# Patient Record
Sex: FEMALE | Race: WHITE | Employment: FULL TIME | URBAN - METROPOLITAN AREA
[De-identification: names, ages, dates, MRNs, and addresses within clinical notes are randomized per-mention and may not be internally consistent; named-entity substitution may affect disease eponyms.]

---

## 2017-04-06 ENCOUNTER — TRANSFERRED RECORDS (OUTPATIENT)
Dept: HEALTH INFORMATION MANAGEMENT | Facility: CLINIC | Age: 67
End: 2017-04-06

## 2017-04-11 ENCOUNTER — TRANSFERRED RECORDS (OUTPATIENT)
Dept: HEALTH INFORMATION MANAGEMENT | Facility: CLINIC | Age: 67
End: 2017-04-11

## 2017-04-20 ENCOUNTER — TRANSFERRED RECORDS (OUTPATIENT)
Dept: HEALTH INFORMATION MANAGEMENT | Facility: CLINIC | Age: 67
End: 2017-04-20

## 2017-04-21 ENCOUNTER — TRANSFERRED RECORDS (OUTPATIENT)
Dept: HEALTH INFORMATION MANAGEMENT | Facility: CLINIC | Age: 67
End: 2017-04-21

## 2017-05-12 ENCOUNTER — HISTORIC RESULTS (OUTPATIENT)
Dept: ADMINISTRATIVE | Age: 67
End: 2017-05-12

## 2017-05-16 ENCOUNTER — TRANSFERRED RECORDS (OUTPATIENT)
Dept: HEALTH INFORMATION MANAGEMENT | Facility: CLINIC | Age: 67
End: 2017-05-16

## 2017-05-23 ENCOUNTER — TRANSFERRED RECORDS (OUTPATIENT)
Dept: HEALTH INFORMATION MANAGEMENT | Facility: CLINIC | Age: 67
End: 2017-05-23

## 2017-06-02 ENCOUNTER — TRANSFERRED RECORDS (OUTPATIENT)
Dept: HEALTH INFORMATION MANAGEMENT | Facility: CLINIC | Age: 67
End: 2017-06-02

## 2017-06-05 ENCOUNTER — OFFICE VISIT (OUTPATIENT)
Dept: RADIATION ONCOLOGY | Facility: CLINIC | Age: 67
End: 2017-06-05
Payer: COMMERCIAL

## 2017-06-05 VITALS
BODY MASS INDEX: 25.47 KG/M2 | TEMPERATURE: 98 F | SYSTOLIC BLOOD PRESSURE: 134 MMHG | OXYGEN SATURATION: 98 % | HEART RATE: 59 BPM | HEIGHT: 67 IN | RESPIRATION RATE: 18 BRPM | WEIGHT: 162.3 LBS | DIASTOLIC BLOOD PRESSURE: 61 MMHG

## 2017-06-05 DIAGNOSIS — C50.411 MALIGNANT NEOPLASM OF UPPER-OUTER QUADRANT OF RIGHT FEMALE BREAST (H): Primary | ICD-10-CM

## 2017-06-05 PROCEDURE — 99205 OFFICE O/P NEW HI 60 MIN: CPT | Performed by: RADIOLOGY

## 2017-06-05 RX ORDER — ASPIRIN 81 MG/1
81 TABLET, CHEWABLE ORAL
COMMUNITY
Start: 2017-04-21

## 2017-06-05 RX ORDER — UBIDECARENONE 100 MG
100 CAPSULE ORAL
COMMUNITY
Start: 2017-04-21

## 2017-06-05 RX ORDER — ZOLPIDEM TARTRATE 5 MG/1
5 TABLET ORAL
COMMUNITY
Start: 2016-09-27

## 2017-06-05 RX ORDER — FEXOFENADINE HCL 60 MG/1
60 TABLET, FILM COATED ORAL
COMMUNITY
Start: 2017-04-21

## 2017-06-05 RX ORDER — LEVOTHYROXINE SODIUM 200 UG/1
200 TABLET ORAL
COMMUNITY
Start: 2016-09-27

## 2017-06-05 ASSESSMENT — LIFESTYLE VARIABLES: SUBSTANCE_ABUSE: 0

## 2017-06-05 ASSESSMENT — ENCOUNTER SYMPTOMS
CARDIOVASCULAR NEGATIVE: 1
MUSCULOSKELETAL NEGATIVE: 1
RESPIRATORY NEGATIVE: 1
HALLUCINATIONS: 0
DEPRESSION: 0
NERVOUS/ANXIOUS: 1
INSOMNIA: 0
EYES NEGATIVE: 1
CONSTITUTIONAL NEGATIVE: 1
MEMORY LOSS: 0
GASTROINTESTINAL NEGATIVE: 1

## 2017-06-05 ASSESSMENT — PAIN SCALES - GENERAL: PAINLEVEL: NO PAIN (0)

## 2017-06-05 NOTE — PROGRESS NOTES
HPI      Review of Systems   Constitutional: Negative.    HENT: Negative.    Eyes: Negative.    Respiratory: Negative.    Cardiovascular: Negative.    Gastrointestinal: Negative.    Genitourinary: Negative.    Musculoskeletal: Negative.    Skin: Negative.    Endo/Heme/Allergies: Negative.    Psychiatric/Behavioral: Negative for depression, hallucinations, memory loss, substance abuse and suicidal ideas. The patient is nervous/anxious. The patient does not have insomnia.         Patient reports nervousness/anxiety with recent diagnosis and treatment options, reports strong support system.             INITIAL PATIENT ASSESSMENT    Diagnosis: DCIS (right breast)    Prior radiation therapy: None    Prior chemotherapy: None    Prior hormonal therapy: Patient reports history of oral birth control use for approximately six months, also reports use IUD and Estradiol.      Pain Eval:  Denies    Psychosocial  Living arrangements: Lives at home in [place] with [place]  Fall Risk: independent   referral needs: Not needed    Advanced Directive: Yes, on file with [facility]  Implantable Cardiac Device? No    Onset of menarche: age 10  LMP: No LMP recorded. Patient is postmenopausal.  Onset of menopause: age 45  Abnormal vaginal bleeding/discharge: No  Are you pregnant? No  Reproductive note: Patient reports history of two pregnancies, two living children, first pregnancy at age 22.  History of tubal ligation in 1980.    PCP: Dr. Sonia Bob  Surgeon: Dr. Steffanie Bee

## 2017-06-05 NOTE — MR AVS SNAPSHOT
After Visit Summary   6/5/2017    Debbie Acuna    MRN: [id number]           Patient Information     Date Of Birth          1950        Visit Information        Provider Department      6/5/2017 1:00 PM Jennifer Haley MD [facility]        Today's Diagnoses     Malignant neoplasm of upper-outer quadrant of right female breast (H)    -  1      Care Instructions    Please contact [facility] RN with questions or concerns following today's appointment: [phone].    Thank you!            Follow-ups after your visit        Who to contact     If you have questions or need follow up information about today's clinic visit or your schedule please contact [facility] directly at [phone].  Normal or non-critical lab and imaging results will be communicated to you by agÃƒÂ¡mi Systemshart, letter or phone within 4 business days after the clinic has received the results. If you do not hear from us within 7 days, please contact the clinic through agÃƒÂ¡mi Systemshart or phone. If you have a critical or abnormal lab result, we will notify you by phone as soon as possible.  Submit refill requests through ZoomForth or call your pharmacy and they will forward the refill request to us. Please allow 3 business days for your refill to be completed.          Additional Information About Your Visit        agÃƒÂ¡mi SystemsharCarbay Information     ZoomForth gives you secure access to your electronic health record. If you see a primary care provider, you can also send messages to your care team and make appointments. If you have questions, please call your primary care clinic.  If you do not have a primary care provider, please call [phone] and they will assist you.      ZoomForth is an electronic gateway that provides easy, online access to your medical records. With ZoomForth, you can request a clinic appointment, read your test results, renew a prescription or communicate with your care  "team.     To access your existing account, please contact your [facility] or call [phone] for assistance.        Care EveryWhere ID     This is your Care EveryWhere ID. This could be used by other organizations to access your [place] medical records  [id number]        Your Vitals Were     Pulse Temperature Respirations Height Pulse Oximetry BMI (Body Mass Index)    59 98  F (36.7  C) (Oral) 18 1.689 m (5' 6.5\") 98% 25.8 kg/m2       Blood Pressure from Last 3 Encounters:   06/05/17 134/61   09/23/15 118/72   08/30/06 112/64    Weight from Last 3 Encounters:   06/05/17 73.6 kg (162 lb 4.8 oz)   09/23/15 83 kg (183 lb)   08/30/06 77.1 kg (170 lb)              Today, you had the following     No orders found for display         Today's Medication Changes          These changes are accurate as of: 6/5/17  4:15 PM.  If you have any questions, ask your nurse or doctor.               These medicines have changed or have updated prescriptions.        Dose/Directions    fexofenadine 60 MG tablet   Commonly known as:  ALLEGRA   This may have changed:  Another medication with the same name was removed. Continue taking this medication, and follow the directions you see here.   Changed by:  Jennifer Haley MD        Dose:  60 mg   Take 60 mg by mouth   Refills:  0       levothyroxine 200 MCG tablet   Commonly known as:  SYNTHROID/LEVOTHROID   This may have changed:  Another medication with the same name was removed. Continue taking this medication, and follow the directions you see here.   Changed by:  Jennifer Haley MD        Dose:  200 mcg   Take 200 mcg by mouth   Refills:  0         Stop taking these medicines if you haven't already. Please contact your care team if you have questions.     diclofenac 1 % Gel topical gel   Commonly known as:  VOLTAREN   Stopped by:  Jennifer Haley MD           methocarbamol 750 MG tablet   Generic drug:  methocarbamol "   Stopped by:  Jennifer Haley MD           scopolamine 72 hr patch   Commonly known as:  TRANSDERM   Stopped by:  Jennifer Haley MD                    Primary Care Provider Office Phone # Fax #    Sonia Florecita Bob [phone]       [address] [address]        Thank you!     Thank you for choosing [facility]  for your care. Our goal is always to provide you with excellent care. Hearing back from our patients is one way we can continue to improve our services. Please take a few minutes to complete the written survey that you may receive in the mail after your visit with us. Thank you!             Your Updated Medication List - Protect others around you: Learn how to safely use, store and throw away your medicines at www.disposemymeds.org.          This list is accurate as of: 6/5/17  4:15 PM.  Always use your most recent med list.                   Brand Name Dispense Instructions for use    aspirin 81 MG chewable tablet      Take 81 mg by mouth       calcium carb 1250 mg (500 mg [place])/vitamin D 200 units 500-200 MG-UNIT per tablet    OSCAL with D     Take 1 tablet by mouth       fexofenadine 60 MG tablet    ALLEGRA     Take 60 mg by mouth       levothyroxine 200 MCG tablet    SYNTHROID/LEVOTHROID     Take 200 mcg by mouth       NAPROSYN 500 MG tablet   Generic drug:  naproxen     60    1 TABLET TWICE DAILY as needed for back pain       SM COENZYME Q-10 100 MG Caps capsule   Generic drug:  co-enzyme Q-10      Take 100 mg by mouth       TYLENOL PO      Take 500 mg by mouth       zolpidem 5 MG tablet    AMBIEN     Take 5 mg by mouth

## 2017-06-05 NOTE — PROGRESS NOTES
RADIATION ONCOLOGY CONSULT NOTE  Date of Visit: 2017    Debbie Acuna  MRN: [id number]  : 1950    Debbie Acuna is being seen today for initial consultation at the request of Dr. Steffanie Bee for consideration of radiation therapy for  DCIS of the R breast          HISTORY OF PRESENT ILLNESS:  Ms. Acuna is a 66 year old female who underwent routine grady mammography 17.  An abnormal area was noted on the R and she underwent add'l views on 17.  At that time she had a 1.4 cm area of grouped calcifications in the R breast at 10:00 5.5cm from the nipple. She underwent core bx which confirmed grade 3 DCIS w/ necrosis and was ER neg.  MRI of the breasts revealed no other sites of concern.  She underwent R lumpectomy by Dr. Bee on 17.  Final path confirmed a 4.1 cm grade 3 DCIS w/ necrosis and positive margins.  Re-excision on 17 was performed with very close (less than 1mm) margins laterally, superiorly and inferiorly. Dr. Bee discussed options w/ Ms. Acuna and asked for a discussion w/ radiation oncology as well.      Ms. Acuna has a family history of breast cancer as her sister was treated at age 52.    , age 24 at time of first child's birth, menarche age 10, LMP age 47        All pertinent labs, imaging, and pathology findings have been reviewed.       CHEMOTHERAPY HISTORY: none    PAST RADIATION THERAPY HISTORY:  none      PAST MEDICAL/SURGICAL HISTORY:  Past Medical History:   Diagnosis Date     Allergic rhinitis, cause unspecified      Backache, unspecified      DCIS (ductal carcinoma in situ) 2017    Right breast     Displacement of lumbar intervertebral disc without myelopathy     herniated disc by MRI     Other extrapyramidal disease and abnormal movement disorder      Pain in limb     Leg pain - Left     Unspecified hypothyroidism      Vaginal dryness, menopausal      Past Surgical History:   Procedure Laterality Date     BREAST BIOPSY, CORE RT/LT  Right 2017     C LIGATE FALLOPIAN TUBE       HC COLONOSCOPY W/WO BRUSH/WASH  05     LUMPECTOMY BREAST Right 2017    Dr. Steffanie Bee     RIGHT BREAST RE-EXCISION LUMPECTOMY Right 2017    Dr. Steffanie Bee     TONSILLECTOMY      age 4       ALLERGIES:  Allergies as of 2017 - [place] as Reviewed 2017   Allergen Reaction Noted     No known drug allergies  2005       MEDICATIONS:  Current Outpatient Prescriptions   Medication Sig Dispense Refill     aspirin 81 MG chewable tablet Take 81 mg by mouth       calcium carb 1250 mg, 500 mg [place],/vitamin D 200 units (OSCAL WITH D) 500-200 MG-UNIT per tablet Take 1 tablet by mouth       co-enzyme Q-10 (SM COENZYME Q-10) 100 MG CAPS capsule Take 100 mg by mouth       zolpidem (AMBIEN) 5 MG tablet Take 5 mg by mouth       levothyroxine (SYNTHROID/LEVOTHROID) 200 MCG tablet Take 200 mcg by mouth       Acetaminophen (TYLENOL PO) Take 500 mg by mouth       NAPROSYN 500 MG OR TABS 1 TABLET TWICE DAILY as needed for back pain 60 0     fexofenadine (ALLEGRA) 60 MG tablet Take 60 mg by mouth          FAMILY HISTORY:  Family History   Problem Relation Age of Onset     DIABETES Mother      Lymphoma Mother 75      age 76     CEREBROVASCULAR DISEASE Father      Hypertension Father      Breast Cancer Sister 52     CANCER Brother 48     Adrenal gland, s/p exposure to agent orange     Depression No family hx of      Lipids No family hx of      Respiratory No family hx of        Additional Family Cancer History: The patient's sister was treated for breast cancer at age 52.      SOCIAL HISTORY:  Social History     Social History     Marital status:      Spouse name: N/A     Number of children: N/A     Years of education: N/A     Occupational History     Not on file.     Social History Main Topics     Smoking status: Never Smoker     Smokeless tobacco: Never Used     Alcohol use Yes      Comment: occasional 1 every 2 weeks or so     Drug  "use: No     Sexual activity: Yes     Partners: Male     Birth control/ protection: Post-menopausal     Other Topics Concern     Not on file     Social History Narrative       REVIEW OF SYSTEMS: A 10 point review of systems was obtained. Pertinent findings are noted in the HPI and are otherwise unremarkable.     PHYSICAL EXAM:  VITALS: /61 (BP Location: Left arm, Patient Position: Chair, Cuff Size: Adult Regular)  Pulse 59  Temp 98  F (36.7  C) (Oral)  Resp 18  Ht 1.689 m (5' 6.5\")  Wt 73.6 kg (162 lb 4.8 oz)  SpO2 98%  BMI 25.8 kg/m2  GEN: Appears well, alert, oriented, and in NAD  HEENT: NCAT, PERRL, EOMI, normal conjunctiva,   NECK: Supple, full ROM, no cervical or clavicular lymphadenopathy  CV: RRR, no murmurs/rubs/gallops, warm and well-perfused  RESP: CTAB, no wheezes/rales/rhonchi, good aeration throughout all lung fields, breathing comfortably on room air  ABDOMEN: Soft, NT, ND, bowel sounds present  BREASTS:  L breast is without findings.  The R breast exam reveals a well healed incision.  No evidence of infection.  No mass.  No nodularity, tenderness or discharge.    SKIN: Normal color and turgor  NEURO: No focal deficits, CN 3-12 grossly intact, normal and symmetric motor and sensory exam in bilateral upper and lower extremities, normal gait  PSYCH: Appropriate mood and affect      ECOG PERFORMANCE STATUS: 0      Radiology Reports:    Bg mammos 4/6/17:  Grouping of calcifications within R breast at 10:00  Diagnostic mammo on R 4/11/17:  1.4 cm grouped pleomorphic microcalcs on R at 10:00 5.5 cm from nipple.   MRI breasts 4/20/17:  Non mass enhancement UOQ R breast measuring over 4 cm.  No other findings.       Pathology Reports:     Core bx 4/11/17:  DCIS,  ER neg  R lumpectomy 5/16/17:  4.1 cm grade 3 DCIS w/ necrosis,  Several margins pos or less than 1 mm.  Re-excision 5/23/17:  Residual DCIS w/ several margins (sup, lat, inf) less than 1mm      IMPRESSION:  In summary, Ms. Acuna is a 66 " year old female who presents with DCIS of the R breast.  She has pos margins despite re-excision.  She has discussed further surgery w/ Dr. Bee and is here for opinion as to whether or not radiation can obviate the need for further surgery.         RECOMMENDATION:  Radiation, at this point, would not eradicate the high risk of recurrence.  She had much more disease than originally suspected on mammograms and adding radiation would make further evaluation and follow up even more difficult. I recommend further surgery to clear the margins.  If that can be done w/ another excision then radiation would be needed after to decrease the risk of recurrence.  If the decision is to remove all the breast tissue with mastectomy, then radiation would not be needed unless there are surprising findings that I wouldn't suspect at this point.  I have a call in to Dr. Bee to let her know of our discussion today.         Jennifer Haley M.D.  Radiation Oncology

## 2017-06-05 NOTE — PATIENT INSTRUCTIONS
Please contact [facility] RN with questions or concerns following today's appointment: [phone].    Thank you!

## 2017-07-06 ENCOUNTER — HISTORIC RESULTS (OUTPATIENT)
Dept: ADMINISTRATIVE | Age: 67
End: 2017-07-06

## 2017-10-03 ENCOUNTER — OFFICE VISIT (OUTPATIENT)
Dept: OBGYN | Facility: CLINIC | Age: 67
End: 2017-10-03
Payer: COMMERCIAL

## 2017-10-03 VITALS
BODY MASS INDEX: 28.31 KG/M2 | SYSTOLIC BLOOD PRESSURE: 110 MMHG | HEIGHT: 64 IN | DIASTOLIC BLOOD PRESSURE: 70 MMHG | WEIGHT: 165.8 LBS

## 2017-10-03 DIAGNOSIS — Z85.3 PERSONAL HISTORY OF BREAST CANCER: ICD-10-CM

## 2017-10-03 DIAGNOSIS — N89.8 VAGINAL DISCHARGE: Primary | ICD-10-CM

## 2017-10-03 LAB
SPECIMEN SOURCE: ABNORMAL
WET PREP SPEC: ABNORMAL

## 2017-10-03 PROCEDURE — 99214 OFFICE O/P EST MOD 30 MIN: CPT | Performed by: OBSTETRICS & GYNECOLOGY

## 2017-10-03 PROCEDURE — 87210 SMEAR WET MOUNT SALINE/INK: CPT | Performed by: OBSTETRICS & GYNECOLOGY

## 2017-10-03 RX ORDER — METRONIDAZOLE 7.5 MG/G
1 GEL VAGINAL AT BEDTIME
Qty: 70 G | Refills: 0 | Status: SHIPPED | OUTPATIENT
Start: 2017-10-03 | End: 2017-10-08

## 2017-10-03 NOTE — MR AVS SNAPSHOT
"              After Visit Summary   10/3/2017    Debbie Acuna    MRN: [id number]           Patient Information     Date Of Birth          1950        Visit Information        Provider Department      10/3/2017 10:20 AM Ingris Miller MD [facility]        Today's Diagnoses     Vaginal discharge    -  1    Personal history of breast cancer           Follow-ups after your visit        Who to contact     If you have questions or need follow up information about today's clinic visit or your schedule please contact [facility] directly at [phone].  Normal or non-critical lab and imaging results will be communicated to you by Curate.Ushart, letter or phone within 4 business days after the clinic has received the results. If you do not hear from us within 7 days, please contact the clinic through Curate.Ushart or phone. If you have a critical or abnormal lab result, we will notify you by phone as soon as possible.  Submit refill requests through CogniK or call your pharmacy and they will forward the refill request to us. Please allow 3 business days for your refill to be completed.          Additional Information About Your Visit        MyChart Information     CogniK gives you secure access to your electronic health record. If you see a primary care provider, you can also send messages to your care team and make appointments. If you have questions, please call your primary care clinic.  If you do not have a primary care provider, please call [phone] and they will assist you.        Care EveryWhere ID     This is your Care EveryWhere ID. This could be used by other organizations to access your [place] medical records  [id number]        Your Vitals Were     Height BMI (Body Mass Index)                5' 3.75\" (1.619 m) 28.68 kg/m2           Blood Pressure from Last 3 Encounters:   10/03/17 110/70   06/05/17 134/61   09/23/15 118/72    Weight from Last 3 Encounters: "   10/03/17 165 lb 12.8 oz (75.2 kg)   06/05/17 162 lb 4.8 oz (73.6 kg)   09/23/15 183 lb (83 kg)              We Performed the Following     Wet  Prep          Today's Medication Changes          These changes are accurate as of: 10/3/17 11:31 AM.  If you have any questions, ask your nurse or doctor.               Start taking these medicines.        Dose/Directions    metroNIDAZOLE 0.75 % vaginal gel   Commonly known as:  METROGEL   Used for:  Vaginal discharge   Started by:  Ingris Miller MD        Dose:  1 applicator   Place 1 applicator (5 g) vaginally At Bedtime for 5 days   Quantity:  70 g   Refills:  0            Where to get your medicines      These medications were sent to [facility] [address] [place] [address]     Phone:  [phone]     metroNIDAZOLE 0.75 % vaginal gel                Primary Care Provider Office Phone # Fax #    Sonia Bob [phone]       [address] [address]        Equal Access to Services     [facility] AH: Hadii marietta ku hadasho Soomaali, waaxda luqadaha, qaybta kaalmada adebossmanyadeion, robert santoyo. So [facility] [phone].    ATENCIÓN: Si habla español, tiene a germain disposición servicios gratuitos de asistencia lingüística. Gege al [phone].    We comply with applicable federal civil rights laws and [place] laws. We do not discriminate on the basis of race, color, national origin, age, disability, sex, sexual orientation, or gender identity.            Thank you!     Thank you for choosing [facility]  for your care. Our goal is always to provide you with excellent care. Hearing back from our patients is one way we can continue to improve our services. Please take a few minutes to complete the written survey that you may receive in the mail after your visit with us. Thank you!             Your Updated Medication List -  Protect others around you: Learn how to safely use, store and throw away your medicines at www.disposemymeds.org.          This list is accurate as of: 10/3/17 11:31 AM.  Always use your most recent med list.                   Brand Name Dispense Instructions for use Diagnosis    aspirin 81 MG chewable tablet      Take 81 mg by mouth        calcium carb 1250 mg (500 mg [place])/vitamin D 200 units 500-200 MG-UNIT per tablet    OSCAL with D     Take 1 tablet by mouth        fexofenadine 60 MG tablet    ALLEGRA     Take 60 mg by mouth        levothyroxine 200 MCG tablet    SYNTHROID/LEVOTHROID     Take 200 mcg by mouth        metroNIDAZOLE 0.75 % vaginal gel    METROGEL    70 g    Place 1 applicator (5 g) vaginally At Bedtime for 5 days    Vaginal discharge       NAPROSYN 500 MG tablet   Generic drug:  naproxen     60    1 TABLET TWICE DAILY as needed for back pain        SM COENZYME Q-10 100 MG Caps capsule   Generic drug:  co-enzyme Q-10      Take 100 mg by mouth        TYLENOL PO      Take 500 mg by mouth        zolpidem 5 MG tablet    AMBIEN     Take 5 mg by mouth

## 2017-10-03 NOTE — PROGRESS NOTES
SUBJECTIVE:                                                   Debbie Acuna is a 67 year old female who presents to clinic today for the following health issue(s):  Patient presents with:  Vaginal Problem: green yellow thick discharge. no odor or irritation. patient states has had symptoms for 2 months now. tried otc treatment with no improvement.        HPI:  Patient had ductal cancer treated this summer stage 0 so hasn't seen oncology  Stopped estring last winter  Now has chronic thick yellow vaginal discharge  No itching or burning   has ED so not sexually active    No LMP recorded. Patient is postmenopausal..   Patient is sexually active, .  Using menopause for contraception.    reports that she has never smoked. She has never used smokeless tobacco.      STD testing offered?  Declined    Health maintenance updated:  yes    Today's PHQ-2 Score: No flowsheet data found.  Today's PHQ-9 Score:   PHQ-9 SCORE 2015   Total Score 0     Today's ANA-7 Score:   ANA-7 SCORE 2015   Total Score 0       Problem list and histories reviewed & adjusted, as indicated.  Additional history: as documented.    Patient Active Problem List   Diagnosis     Hypothyroidism     Degeneration of lumbar or lumbosacral intervertebral disc     Vaginal dryness, menopausal     Allergic rhinitis due to other allergen     Backache     Hyperlipidemia     Hypothyroidism due to acquired atrophy of thyroid     Past Surgical History:   Procedure Laterality Date     BREAST BIOPSY, CORE RT/LT Right 2017     C LIGATE FALLOPIAN TUBE       HC COLONOSCOPY W/WO BRUSH/WASH  05     LUMPECTOMY BREAST Right 2017    Dr. Steffanie Bee     MASTECTOMY      right breast     RIGHT BREAST RE-EXCISION LUMPECTOMY Right 2017    Dr. Steffanie Bee     TONSILLECTOMY      age 4      Social History   Substance Use Topics     Smoking status: Never Smoker     Smokeless tobacco: Never Used     Alcohol use Yes      Comment:  "occasional 1 every 2 weeks or so      Problem (# of Occurrences) Relation (Name,Age of Onset)    Breast Cancer (1) Sister (52)    CANCER (1) Brother (48): Adrenal gland, s/p exposure to agent orange    CEREBROVASCULAR DISEASE (1) Father    DIABETES (1) Mother    Hypertension (1) Father    Lymphoma (1) Mother (75):  age 76    Thyroid Disease (4) Mother, Sister, Brother, Maternal Grandmother       Negative family history of: Depression, Lipids, Respiratory            Current Outpatient Prescriptions   Medication Sig     aspirin 81 MG chewable tablet Take 81 mg by mouth     calcium carb 1250 mg, 500 mg Ambler,/vitamin D 200 units (OSCAL WITH D) 500-200 MG-UNIT per tablet Take 1 tablet by mouth     co-enzyme Q-10 (SM COENZYME Q-10) 100 MG CAPS capsule Take 100 mg by mouth     zolpidem (AMBIEN) 5 MG tablet Take 5 mg by mouth     fexofenadine (ALLEGRA) 60 MG tablet Take 60 mg by mouth     levothyroxine (SYNTHROID/LEVOTHROID) 200 MCG tablet Take 200 mcg by mouth     Acetaminophen (TYLENOL PO) Take 500 mg by mouth     NAPROSYN 500 MG OR TABS 1 TABLET TWICE DAILY as needed for back pain     No current facility-administered medications for this visit.      Allergies   Allergen Reactions     No Known Drug Allergies        ROS:  12 point review of systems negative other than symptoms noted below.  Genitourinary: Vaginal Discharge    OBJECTIVE:     /70  Ht 5' 3.75\" (1.619 m)  Wt 165 lb 12.8 oz (75.2 kg)  BMI 28.68 kg/m2  Body mass index is 28.68 kg/(m^2).    Exam:  Constitutional:  Appearance: Well nourished, well developed alert, in no acute distress  Chest:  Respiratory Effort:  Breathing unlabored  Cardiovascular: Heart: Auscultation:  Regular rate, normal rhythm, no murmurs present  Skin:General Inspection:  No rashes present, no lesions present, no areas of discoloration; Genitalia and Groin:  No rashes present, no lesions present, no areas of discoloration, no masses present.  Neurologic/Psychiatric:  Mental " Status:  Oriented X3   Pelvic Exam:  External Genitalia:     Normal appearance for age, no discharge present, no tenderness present, no inflammatory lesions present, color normal  Vagina:     Normal vaginal vault without central or paravaginal defects, ATROPHIC yellow discharge present  Bladder:     Nontender to palpation  Urethra:   Urethral Body:  Urethra palpation normal, urethra structural support normal   Urethral Meatus:  No erythema or lesions present  Cervix:     Appearance healthy, no lesions present, nontender to palpation, no bleeding present  Uterus:     Nontender to palpation, no masses present, position anteflexed, mobility: normal  Adnexa:     No adnexal tenderness present, no adnexal masses present  Perineum:     Perineum within normal limits, no evidence of trauma, no rashes or skin lesions present  Inguinal Lymph Nodes:     No lymphadenopathy present         In-Clinic Test Results:  No results found for this or any previous visit (from the past 24 hour(s)).    ASSESSMENT/PLAN:                                                        ICD-10-CM    1. Vaginal discharge N89.8 Wet  Prep     Gram stain   2. Personal history of breast cancer Z85.3      metrogel sent  onc consult next spring since she hasn't seen one  Was est receptor negative on her tissue  And her sister had breast ca  Face to face time 25 minute, more than 50% counceling  Discussed risks of using estrogen in breast cancer, I am not comfortable with this  Discussed types of breast cancern    Ingris Miller MD  [facility] [place]

## 2019-11-07 ENCOUNTER — HEALTH MAINTENANCE LETTER (OUTPATIENT)
Age: 69
End: 2019-11-07

## 2020-02-16 ENCOUNTER — HEALTH MAINTENANCE LETTER (OUTPATIENT)
Age: 70
End: 2020-02-16

## 2020-11-29 ENCOUNTER — HEALTH MAINTENANCE LETTER (OUTPATIENT)
Age: 70
End: 2020-11-29

## 2021-04-10 ENCOUNTER — HEALTH MAINTENANCE LETTER (OUTPATIENT)
Age: 71
End: 2021-04-10

## 2021-09-19 ENCOUNTER — HEALTH MAINTENANCE LETTER (OUTPATIENT)
Age: 71
End: 2021-09-19

## 2021-11-14 ENCOUNTER — HEALTH MAINTENANCE LETTER (OUTPATIENT)
Age: 71
End: 2021-11-14

## 2022-05-01 ENCOUNTER — HEALTH MAINTENANCE LETTER (OUTPATIENT)
Age: 72
End: 2022-05-01

## 2022-12-25 ENCOUNTER — HEALTH MAINTENANCE LETTER (OUTPATIENT)
Age: 72
End: 2022-12-25

## 2023-06-02 ENCOUNTER — HEALTH MAINTENANCE LETTER (OUTPATIENT)
Age: 73
End: 2023-06-02

## 2023-11-26 ENCOUNTER — HEALTH MAINTENANCE LETTER (OUTPATIENT)
Age: 73
End: 2023-11-26